# Patient Record
Sex: MALE | Race: WHITE | NOT HISPANIC OR LATINO | URBAN - METROPOLITAN AREA
[De-identification: names, ages, dates, MRNs, and addresses within clinical notes are randomized per-mention and may not be internally consistent; named-entity substitution may affect disease eponyms.]

---

## 2017-02-08 ENCOUNTER — OFFICE VISIT - HEALTHEAST (OUTPATIENT)
Dept: FAMILY MEDICINE | Facility: CLINIC | Age: 77
End: 2017-02-08

## 2017-02-08 DIAGNOSIS — I16.0 HYPERTENSIVE URGENCY: ICD-10-CM

## 2017-02-08 DIAGNOSIS — I10 ESSENTIAL HYPERTENSION, BENIGN: ICD-10-CM

## 2017-02-08 DIAGNOSIS — M79.89 SWELLING OF LIMB: ICD-10-CM

## 2017-02-08 DIAGNOSIS — E78.2 MIXED HYPERLIPIDEMIA: ICD-10-CM

## 2017-02-08 LAB
CHOLEST SERPL-MCNC: 223 MG/DL
FASTING STATUS PATIENT QL REPORTED: ABNORMAL
HDLC SERPL-MCNC: 30 MG/DL
LDLC SERPL CALC-MCNC: 157 MG/DL
TRIGL SERPL-MCNC: 179 MG/DL

## 2017-02-08 ASSESSMENT — MIFFLIN-ST. JEOR: SCORE: 2025.81

## 2017-02-09 ENCOUNTER — COMMUNICATION - HEALTHEAST (OUTPATIENT)
Dept: FAMILY MEDICINE | Facility: CLINIC | Age: 77
End: 2017-02-09

## 2017-04-02 ENCOUNTER — COMMUNICATION - HEALTHEAST (OUTPATIENT)
Dept: FAMILY MEDICINE | Facility: CLINIC | Age: 77
End: 2017-04-02

## 2017-04-02 DIAGNOSIS — I10 HTN (HYPERTENSION): ICD-10-CM

## 2017-04-10 ENCOUNTER — OFFICE VISIT - HEALTHEAST (OUTPATIENT)
Dept: FAMILY MEDICINE | Facility: CLINIC | Age: 77
End: 2017-04-10

## 2017-04-10 DIAGNOSIS — K43.9 VENTRAL HERNIA: ICD-10-CM

## 2017-04-10 DIAGNOSIS — E78.2 MIXED HYPERLIPIDEMIA: ICD-10-CM

## 2017-04-10 DIAGNOSIS — I10 ESSENTIAL HYPERTENSION, BENIGN: ICD-10-CM

## 2017-04-10 LAB
CHOLEST SERPL-MCNC: 209 MG/DL
FASTING STATUS PATIENT QL REPORTED: YES
HDLC SERPL-MCNC: 31 MG/DL
LDLC SERPL CALC-MCNC: 134 MG/DL
TRIGL SERPL-MCNC: 219 MG/DL

## 2017-04-10 ASSESSMENT — MIFFLIN-ST. JEOR: SCORE: 2005.4

## 2017-04-11 ENCOUNTER — COMMUNICATION - HEALTHEAST (OUTPATIENT)
Dept: FAMILY MEDICINE | Facility: CLINIC | Age: 77
End: 2017-04-11

## 2017-05-23 ENCOUNTER — COMMUNICATION - HEALTHEAST (OUTPATIENT)
Dept: FAMILY MEDICINE | Facility: CLINIC | Age: 77
End: 2017-05-23

## 2017-05-23 DIAGNOSIS — I10 HTN (HYPERTENSION): ICD-10-CM

## 2017-07-21 ENCOUNTER — OFFICE VISIT - HEALTHEAST (OUTPATIENT)
Dept: FAMILY MEDICINE | Facility: CLINIC | Age: 77
End: 2017-07-21

## 2017-07-21 DIAGNOSIS — I10 ESSENTIAL HYPERTENSION, BENIGN: ICD-10-CM

## 2017-07-21 DIAGNOSIS — M79.89 SWELLING OF LIMB: ICD-10-CM

## 2017-07-21 RX ORDER — I-VITE, TAB 1000-60-2MG (60/BT) 300MCG-200
TAB ORAL
Status: SHIPPED | COMMUNITY
Start: 2017-07-21

## 2017-08-03 ENCOUNTER — OFFICE VISIT - HEALTHEAST (OUTPATIENT)
Dept: FAMILY MEDICINE | Facility: CLINIC | Age: 77
End: 2017-08-03

## 2017-08-03 DIAGNOSIS — M54.50 LUMBAGO: ICD-10-CM

## 2017-08-03 DIAGNOSIS — I10 ESSENTIAL HYPERTENSION, BENIGN: ICD-10-CM

## 2017-08-03 DIAGNOSIS — M79.89 SWELLING OF LIMB: ICD-10-CM

## 2017-08-03 ASSESSMENT — MIFFLIN-ST. JEOR: SCORE: 1984.99

## 2017-08-04 ENCOUNTER — COMMUNICATION - HEALTHEAST (OUTPATIENT)
Dept: FAMILY MEDICINE | Facility: CLINIC | Age: 77
End: 2017-08-04

## 2017-09-18 ENCOUNTER — OFFICE VISIT - HEALTHEAST (OUTPATIENT)
Dept: FAMILY MEDICINE | Facility: CLINIC | Age: 77
End: 2017-09-18

## 2017-09-18 ENCOUNTER — COMMUNICATION - HEALTHEAST (OUTPATIENT)
Dept: FAMILY MEDICINE | Facility: CLINIC | Age: 77
End: 2017-09-18

## 2017-09-18 DIAGNOSIS — I10 ESSENTIAL HYPERTENSION, BENIGN: ICD-10-CM

## 2017-09-18 DIAGNOSIS — Z23 NEED FOR IMMUNIZATION AGAINST INFLUENZA: ICD-10-CM

## 2017-09-18 DIAGNOSIS — M79.89 SWELLING OF LIMB: ICD-10-CM

## 2017-09-18 DIAGNOSIS — M54.50 LUMBAGO: ICD-10-CM

## 2017-09-18 DIAGNOSIS — E78.2 MIXED HYPERLIPIDEMIA: ICD-10-CM

## 2017-09-18 DIAGNOSIS — I16.0 HYPERTENSIVE URGENCY: ICD-10-CM

## 2017-09-18 LAB
CHOLEST SERPL-MCNC: 207 MG/DL
FASTING STATUS PATIENT QL REPORTED: YES
HDLC SERPL-MCNC: 29 MG/DL
LDLC SERPL CALC-MCNC: 140 MG/DL
TRIGL SERPL-MCNC: 188 MG/DL

## 2017-09-18 RX ORDER — METOPROLOL TARTRATE 50 MG
50 TABLET ORAL 2 TIMES DAILY
Qty: 180 TABLET | Refills: 1 | Status: SHIPPED | OUTPATIENT
Start: 2017-09-18

## 2017-09-18 ASSESSMENT — MIFFLIN-ST. JEOR: SCORE: 2003.13

## 2017-09-23 ENCOUNTER — COMMUNICATION - HEALTHEAST (OUTPATIENT)
Dept: FAMILY MEDICINE | Facility: CLINIC | Age: 77
End: 2017-09-23

## 2017-09-23 DIAGNOSIS — I16.0 HYPERTENSIVE URGENCY: ICD-10-CM

## 2017-10-07 ENCOUNTER — COMMUNICATION - HEALTHEAST (OUTPATIENT)
Dept: FAMILY MEDICINE | Facility: CLINIC | Age: 77
End: 2017-10-07

## 2017-10-07 DIAGNOSIS — M79.89 SWELLING OF LIMB: ICD-10-CM

## 2017-10-07 DIAGNOSIS — I10 ESSENTIAL HYPERTENSION, BENIGN: ICD-10-CM

## 2018-03-02 ENCOUNTER — COMMUNICATION - HEALTHEAST (OUTPATIENT)
Dept: FAMILY MEDICINE | Facility: CLINIC | Age: 78
End: 2018-03-02

## 2018-03-02 DIAGNOSIS — M79.89 SWELLING OF LIMB: ICD-10-CM

## 2018-03-02 DIAGNOSIS — I16.0 HYPERTENSIVE URGENCY: ICD-10-CM

## 2018-03-02 DIAGNOSIS — I10 ESSENTIAL HYPERTENSION, BENIGN: ICD-10-CM

## 2018-08-30 ENCOUNTER — COMMUNICATION - HEALTHEAST (OUTPATIENT)
Dept: FAMILY MEDICINE | Facility: CLINIC | Age: 78
End: 2018-08-30

## 2018-08-30 DIAGNOSIS — I16.0 HYPERTENSIVE URGENCY: ICD-10-CM

## 2018-08-30 DIAGNOSIS — M79.89 SWELLING OF LIMB: ICD-10-CM

## 2018-08-30 DIAGNOSIS — I10 ESSENTIAL HYPERTENSION, BENIGN: ICD-10-CM

## 2018-08-30 RX ORDER — LISINOPRIL 40 MG/1
TABLET ORAL
Qty: 90 TABLET | Refills: 0 | Status: SHIPPED | OUTPATIENT
Start: 2018-08-30

## 2018-08-30 RX ORDER — HYDROCHLOROTHIAZIDE 25 MG/1
TABLET ORAL
Qty: 90 TABLET | Refills: 0 | Status: SHIPPED | OUTPATIENT
Start: 2018-08-30

## 2019-04-22 ENCOUNTER — RECORDS - HEALTHEAST (OUTPATIENT)
Dept: FAMILY MEDICINE | Facility: CLINIC | Age: 79
End: 2019-04-22

## 2021-05-30 VITALS — BODY MASS INDEX: 42.95 KG/M2 | HEIGHT: 69 IN | WEIGHT: 290 LBS

## 2021-05-30 VITALS — BODY MASS INDEX: 43.8 KG/M2 | WEIGHT: 289 LBS | HEIGHT: 68 IN

## 2021-05-31 ENCOUNTER — RECORDS - HEALTHEAST (OUTPATIENT)
Dept: ADMINISTRATIVE | Facility: CLINIC | Age: 81
End: 2021-05-31

## 2021-05-31 VITALS — WEIGHT: 285 LBS | BODY MASS INDEX: 42.21 KG/M2 | HEIGHT: 69 IN

## 2021-05-31 VITALS — HEIGHT: 69 IN | BODY MASS INDEX: 41.62 KG/M2 | WEIGHT: 281 LBS

## 2021-05-31 VITALS — BODY MASS INDEX: 43.68 KG/M2 | WEIGHT: 287.3 LBS

## 2021-06-01 ENCOUNTER — RECORDS - HEALTHEAST (OUTPATIENT)
Dept: ADMINISTRATIVE | Facility: CLINIC | Age: 81
End: 2021-06-01

## 2021-06-08 NOTE — PROGRESS NOTES
"Assessment:  1.  Hypertension.  2.  Hyperlipidemia, checking status.  3.  Pedal edema, likely at least aggravated by current dose of amlodipine.  4.  Obesity.    Plan: Okay to try reducing amlodipine to 5 mg per day-#90 prescribed.  Refilled his metoprolol and the lisinopril.  Continue efforts at low sodium diet and gradual weight reduction.  Get occasional blood pressure check but follow-up to see me within 3 months.  Return earlier as needed.  He has not tried taking the Zetia but depending on level of lipids could have him try starting that.  Blood pressure not adequately controlled on above regimen then would need to start a different type of diuretic.    Subjective: 76-year-old male presenting for follow-up of the above.  He notes she's had more difficulty with swelling in both legs.  He has tried taking the amlodipine in the evening instead of the morning and feels that helps some but it is still bothersome and wonders if he can cut back on the dose.  When he was on chlorthalidone he had significant symptoms of feeling tired and wiped out.  No headaches or dizziness.  He did not try starting the Zetia prescription.  He wanted to get the blood pressure issue squared away first.  He continues to drive school bus.  His next  physical would be in November.  He notes that he does not have to drive school bus and certainly wants to be a safe  for the children.  Past Medical History:   Diagnosis Date     Hernia      Hyperlipidemia      Hypertension      Migraines      Obese      Allergies   Allergen Reactions     Adhesive Rash     Amoxicillin Itching     Atorvastatin Myalgia     Belching, gassiness, fatigue as well- so stopped  About 2 months ago- symptoms cleared within 2 weeks     Percocet [Oxycodone-Acetaminophen] Nausea And Vomiting     Per patient's wife \"violently ill\"     Pravastatin      SIGNIFICANT ABDOMINAL CRAMPING AND CONSTIPATION     Statins-Hmg-Coa Reductase Inhibitors      Current meds " "include the aspirin 325 mg per day, lisinopril 40 mg per day, metoprolol 50 mg twice a day, and amlodipine 10 mg at bedtime.    Objective:  Visit Vitals     /62     Pulse 80     Resp 16     Ht 5' 9\" (1.753 m)     Wt (!) 290 lb (131.5 kg)     BMI 42.83 kg/m2     HEENT shows no acute change.  Neck supple without adenopathy or thyromegaly.  Lungs clear.  Heart regular rate and rhythm without murmur.  Abdomen rotund but no masses tenderness or hepatosplenomegaly.  Bilateral pedal edema at least 1+.  "

## 2021-06-09 NOTE — PROGRESS NOTES
Assessment:  1.  Hypertension, controlled.  2.  Hyperlipidemia, checking status.  3.  Ventral hernia.  4.  Obesity.    Plan: Check fasting lipid hepatic and basic profiles.  Renew meds as needed for the next 6 months.  Strongly encouraged him regarding weight reduction through reducing portion sizes and physical activity.  Since the ventral hernia does not bother him, can observe it at this time but really try to push the gradual weight reduction.  Follow-up in 6 months or earlier as needed.    Subjective: 76-year-old male presenting for follow-up on the above.  Regarding hypertension no headaches or dizziness and notes that the puffiness in his ankles is much improved.  He does try to watch the salt in his diet.  He acknowledges that he eats too much at his meals, states that he does not snack in between.  He has been intolerant of the statin medications and is not able to take that for his cholesterol.  He is fasting this morning.  He drives school bus and keeps busy with that.  Past Medical History:   Diagnosis Date     Hernia      Hyperlipidemia      Hypertension      Migraines      Obese      Current Outpatient Prescriptions   Medication Sig Dispense Refill     amLODIPine (NORVASC) 5 MG tablet TAKE 1 TABLET BY MOUTH DAILY. 90 tablet 0     aspirin 325 MG tablet Take 1 tablet (325 mg total) by mouth daily.  0     lisinopril (PRINIVIL,ZESTRIL) 40 MG tablet Take 1 tablet (40 mg total) by mouth daily. 90 tablet 1     metoprolol tartrate (LOPRESSOR) 50 MG tablet Take 1 tablet (50 mg total) by mouth 2 (two) times a day. 180 tablet 1     multivitamin (DAILY MULTI-VITAMIN) per tablet Take 1 tablet by mouth daily.        No current facility-administered medications for this visit.      Allergies   Allergen Reactions     Adhesive Rash     Amoxicillin Itching     Atorvastatin Myalgia     Belching, gassiness, fatigue as well- so stopped  About 2 months ago- symptoms cleared within 2 weeks     Percocet  "[Oxycodone-Acetaminophen] Nausea And Vomiting     Per patient's wife \"violently ill\"     Pravastatin      SIGNIFICANT ABDOMINAL CRAMPING AND CONSTIPATION     Statins-Hmg-Coa Reductase Inhibitors      All other review of systems are negative.    Objective:/76 (Patient Site: Left Arm, Patient Position: Sitting, Cuff Size: Adult Large)  Pulse 64  Ht 5' 8\" (1.727 m)  Wt (!) 289 lb (131.1 kg)  BMI 43.94 kg/m2  HEENT shows no acute change.  Neck supple without adenopathy or thyromegaly.  Lungs are clear to auscultation.  Heart regular rate and rhythm without murmur.  Abdomen is rotund, he does have a ventral hernia in the midabdomen at the lower aspect of his midline surgical scar that is about 10 cm diameter.  At most trace edema at the ankles currently.  He is alert with clear speech.  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  "

## 2021-06-12 NOTE — PROGRESS NOTES
"Assessment:  1.  Hypertension, controlled.  2.  Ankle swelling resolved with stopping the amlodipine.  And starting the diuretic.  3.  Mid and lower back pain, resolved.    Plan: Continue hydrochlorothiazide.  Check basic profile today.  Follow-up in September before he is going down to [place] for several months.  Return earlier as needed.  For the back recommend that he pursue weight reduction, is good posture, can use acetaminophen as needed and heat if he has flareup.  I explained he did not have any sign of herniated disc at this time.    Subjective: 76-year-old male presenting for follow-up of the ankle swelling and see the note of July 21.  He has stopped the amlodipine and notes that the puffiness in both his ankles and his hands is better and is taking the hydrochlorothiazide each morning.  He notes that he had difficulty with mid and lower back pain and spasm and that it was hurting quite a bit but that has settled down now and he feels his back has returned to his normal.  But he would like the back checked.  He notes that in past years he had had a CAT scan that showed some degenerative disks.  He is not having any pain rating to the legs or trouble with urination or bowel movements.  Past Medical History:   Diagnosis Date     Hernia      Hyperlipidemia      Hypertension      Migraines      Obese      Allergies   Allergen Reactions     Adhesive Rash     Amoxicillin Itching     Atorvastatin Myalgia     Belching, gassiness, fatigue as well- so stopped  About 2 months ago- symptoms cleared within 2 weeks     Percocet [Oxycodone-Acetaminophen] Nausea And Vomiting     Per patient's wife \"violently ill\"     Pravastatin      SIGNIFICANT ABDOMINAL CRAMPING AND CONSTIPATION     Statins-Hmg-Coa Reductase Inhibitors      Current Outpatient Prescriptions   Medication Sig Dispense Refill     aspirin 325 MG tablet Take 1 tablet (325 mg total) by mouth daily.  0     co-enzyme Q-10 30 mg capsule Take 30 mg by mouth " "daily.        hydroCHLOROthiazide (HYDRODIURIL) 25 MG tablet Take 1 tablet (25 mg total) by mouth daily. 30 tablet 2     lisinopril (PRINIVIL,ZESTRIL) 40 MG tablet Take 1 tablet (40 mg total) by mouth daily. 90 tablet 1     metoprolol tartrate (LOPRESSOR) 50 MG tablet Take 1 tablet (50 mg total) by mouth 2 (two) times a day. 180 tablet 1     multivitamin (DAILY MULTI-VITAMIN) per tablet Take 1 tablet by mouth daily.        vit A,C and E-lutein-minerals 1,000 unit-200 mg-60 unit-2 mg Tab Take by mouth.       No current facility-administered medications for this visit.      All other review of systems currently negative.  He notes he has not been kind his body over the years.    Objective:/72  Pulse 62  Ht 5' 9\" (1.753 m)  Wt (!) 281 lb (127.5 kg)  BMI 41.5 kg/m2  HEENT exam negative.  Neck supple.  Lungs clear.  Heart regular rate and rhythm without murmur.  Abdomen shows no masses tenderness or hepatosplenomegaly but he is overweight.  No current pitting edema at the ankles.  No tenderness to palpation of the back at this time.  He ambulates slowly but independently.  "

## 2021-06-12 NOTE — PROGRESS NOTES
Assessment:     1. (Lower) Leg Localized Swelling Bilateral  hydroCHLOROthiazide (HYDRODIURIL) 25 MG tablet   2. Benign Essential Hypertension  hydroCHLOROthiazide (HYDRODIURIL) 25 MG tablet          Plan:     Chronic bilateral lower extremity edema noted to be worse over the last week or so, likely secondary to inactivity and possibly attributed to by his amlodipine.  We will stop his amlodipine and start him on hydrochlorothiazide instead.  I encouraged him to use his compression stockings regularly and increase his physical activity as able.  Weight loss may help as well and I encouraged him to do so.  Recommend that he keep his legs elevated as much as possible when at rest.  Follow-up with Dr. Wynn his primary care provider in 2 weeks.    Subjective:       76 y.o. male presents for evaluation of bilateral lower extremity edema that has been chronic for a long time but has gotten worse over the past week.  He has been dealing with some back pain over the past couple of weeks and has been a lot more sedentary than usual.  He does try to eat a low-sodium diet.  He did notice at his last hospitalization after he was started on amlodipine he developed a lot of lower extremity edema.  His dose was cut in half and is still continued to have some lower extremity swelling though this did improve.  It has gotten worse over the past week however.  He denies any chest pain, shortness of breath, orthopnea, PND, abdominal pain, lightheadedness, dizziness, or any other concerning symptoms.  He occasionally uses compression stockings and when he is at rest he tries to keep his feet elevated.    The following portions of the patient's history were reviewed and updated as appropriate: allergies, current medications, past family history, past medical history, past social history, past surgical history and problem list.    Review of Systems  A 12 point comprehensive review of systems was negative except as noted.     Objective:       /70  Pulse 63  Temp 98.6  F (37  C) (Oral)   Resp 16  Wt (!) 287 lb 4.8 oz (130.3 kg)  SpO2 91%  BMI 43.68 kg/m2  General appearance: alert, appears stated age and cooperative  Neck: no adenopathy, no JVD and thyroid not enlarged, symmetric, no tenderness/mass/nodules  Lungs: clear to auscultation bilaterally  Heart: regular rate and rhythm, S1, S2 normal, no murmur, click, rub or gallop  Abdomen: soft, non-tender; bowel sounds normal; no masses,  no organomegaly and rotund  Extremities: extremities normal, atraumatic, no cyanosis or edema  Pulses: 2+ and symmetric  Skin: Skin color, texture, turgor normal. No rashes or lesions          This note has been dictated using voice recognition software. Any grammatical or context distortions are unintentional and inherent to the software

## 2021-06-13 NOTE — PROGRESS NOTES
"Assessment:  1.  Hyperlipidemia, checking status.  2.  Hypertension controlled.  3.  Occasional chronic low back pain.  4.  Leg swelling has resolved.    Plan: Check fasting lipid hepatic and basic profiles.  Renewed his hydrochlorothiazide, lisinopril, and metoprolol, each for 90 days and each refillable ×1.  Plan to follow-up here in April when he is back from [place].  Call or return earlier as needed.  Continue efforts at weight reduction and exercise.  He understands and agrees.    Subjective: 76-year-old male presenting for follow-up on the above.  Regarding hyperlipidemia he is fasting, no constipation diarrhea muscle aching or muscle weakness.  Regarding hypertension no headaches or dizziness or leg swelling.  He has had no trouble since being off the amlodipine and on the diuretic type medication.  He has his low back discomfort that is back to being the same as off and on chronically and no leg pain now.  Past Medical History:   Diagnosis Date     Hernia      Hyperlipidemia      Hypertension      Migraines      Obese      Allergies   Allergen Reactions     Adhesive Rash     Amoxicillin Itching     Atorvastatin Myalgia     Belching, gassiness, fatigue as well- so stopped  About 2 months ago- symptoms cleared within 2 weeks     Percocet [Oxycodone-Acetaminophen] Nausea And Vomiting     Per patient's wife \"violently ill\"     Pravastatin      SIGNIFICANT ABDOMINAL CRAMPING AND CONSTIPATION     Statins-Hmg-Coa Reductase Inhibitors      Current Outpatient Prescriptions   Medication Sig Dispense Refill     aspirin 325 MG tablet Take 1 tablet (325 mg total) by mouth daily.  0     co-enzyme Q-10 30 mg capsule Take 30 mg by mouth daily.        hydroCHLOROthiazide (HYDRODIURIL) 25 MG tablet Take 1 tablet (25 mg total) by mouth daily. 90 tablet 1     lisinopril (PRINIVIL,ZESTRIL) 40 MG tablet Take 1 tablet (40 mg total) by mouth daily. 90 tablet 1     metoprolol tartrate (LOPRESSOR) 50 MG tablet Take 1 tablet (50 mg " "total) by mouth 2 (two) times a day. 180 tablet 1     multivitamin (DAILY MULTI-VITAMIN) per tablet Take 1 tablet by mouth daily.        vit A,C and E-lutein-minerals 1,000 unit-200 mg-60 unit-2 mg Tab Take by mouth.       No current facility-administered medications for this visit.      All other review of systems are negative for any other changes.  He notes that he is going to [place] for the winter and his brother has lived down there in the winter for 14 years and notes the area well.    Objective:/74  Pulse 70  Ht 5' 9\" (1.753 m)  Wt (!) 285 lb (129.3 kg)  BMI 42.09 kg/m2  HEENT examination negative.  Neck supple.  Lungs clear.  Heart regular rate and rhythm without murmur.  Abdomen rotund but no masses tenderness or hepatosplenomegaly.  No pitting edema at the ankles.  Is alert with clear speech.  "

## 2021-06-15 PROBLEM — K43.9 VENTRAL HERNIA: Status: ACTIVE | Noted: 2017-04-10
